# Patient Record
Sex: MALE | Race: WHITE | Employment: UNEMPLOYED | URBAN - METROPOLITAN AREA
[De-identification: names, ages, dates, MRNs, and addresses within clinical notes are randomized per-mention and may not be internally consistent; named-entity substitution may affect disease eponyms.]

---

## 2017-02-22 ENCOUNTER — OFFICE VISIT (OUTPATIENT)
Dept: FAMILY MEDICINE CLINIC | Facility: CLINIC | Age: 52
End: 2017-02-22

## 2017-02-22 VITALS — TEMPERATURE: 98 F | WEIGHT: 315 LBS

## 2017-02-22 DIAGNOSIS — R31.9 HEMATURIA: Primary | ICD-10-CM

## 2017-02-22 PROCEDURE — 99212 OFFICE O/P EST SF 10 MIN: CPT | Performed by: FAMILY MEDICINE

## 2017-02-22 PROCEDURE — 99202 OFFICE O/P NEW SF 15 MIN: CPT | Performed by: FAMILY MEDICINE

## 2017-02-22 RX ORDER — CLOPIDOGREL BISULFATE 75 MG/1
TABLET ORAL
Refills: 12 | COMMUNITY
Start: 2017-01-04

## 2017-02-22 RX ORDER — ASPIRIN 81 MG/1
81 TABLET ORAL
COMMUNITY

## 2017-02-22 RX ORDER — ATORVASTATIN CALCIUM 40 MG/1
TABLET, FILM COATED ORAL
Refills: 6 | COMMUNITY
Start: 2017-01-04

## 2017-02-22 RX ORDER — GLYBURIDE 5 MG/1
10 TABLET ORAL
COMMUNITY

## 2017-02-22 RX ORDER — ATENOLOL 50 MG/1
TABLET ORAL
Refills: 6 | COMMUNITY
Start: 2017-01-04

## 2017-02-22 RX ORDER — LISINOPRIL 10 MG/1
TABLET ORAL
Refills: 6 | COMMUNITY
Start: 2017-01-04

## 2017-02-22 NOTE — PROGRESS NOTES
HPI:    Patient ID: Dennis Jorge is a 46year old male. HPI Comments: Pt presents for follow up from the urgent care/ ER for blood in urine. Pt had recent [place] [facility] [place] and was started on plavix and then noticed blood in urine.  Went to th encounter.        Meds This Visit:    No prescriptions requested or ordered in this encounter       Imaging & Referrals:  UROLOGY - INTERNAL       #[phone]

## 2017-02-27 ENCOUNTER — OFFICE VISIT (OUTPATIENT)
Dept: SURGERY | Facility: CLINIC | Age: 52
End: 2017-02-27

## 2017-02-27 VITALS
TEMPERATURE: 98 F | RESPIRATION RATE: 18 BRPM | DIASTOLIC BLOOD PRESSURE: 81 MMHG | BODY MASS INDEX: 42.66 KG/M2 | SYSTOLIC BLOOD PRESSURE: 120 MMHG | HEIGHT: 72 IN | WEIGHT: 315 LBS | HEART RATE: 70 BPM

## 2017-02-27 DIAGNOSIS — R31.0 GROSS HEMATURIA: Primary | ICD-10-CM

## 2017-02-27 DIAGNOSIS — R82.90 URINE FINDING: ICD-10-CM

## 2017-02-27 LAB
APPEARANCE: CLEAR
MULTISTIX LOT#: NORMAL NUMERIC
PH, URINE: 5.5 (ref 4.5–8)
SPECIFIC GRAVITY: 1.02 (ref 1–1.03)
URINE-COLOR: YELLOW
UROBILINOGEN,SEMI-QN: 0.2 MG/DL (ref 0–1.9)

## 2017-02-27 PROCEDURE — 99212 OFFICE O/P EST SF 10 MIN: CPT | Performed by: UROLOGY

## 2017-02-27 PROCEDURE — 99244 OFF/OP CNSLTJ NEW/EST MOD 40: CPT | Performed by: UROLOGY

## 2017-02-27 PROCEDURE — 81003 URINALYSIS AUTO W/O SCOPE: CPT | Performed by: UROLOGY

## 2017-02-27 NOTE — PROGRESS NOTES
SUBJECTIVE:  Dennis Jorge is a 46year old male who presents for a consultation at the request of, and a copy of this note will be sent to, Dr. Kenn Hurst, for evaluation of  hematuria. He states that the problem is much improved.  Symptoms include had stools. GENERAL: Denies:  weight gain, weight loss, fever, night sweats, bone pain, malaise and fatigue. Positive for:  None.   All other ROS reviewed and otherwise normal.    OBJECTIVE:  /81 mmHg  Pulse 70  Temp(Src) 98.4 °F (36.9 °C) (Oral)  Resp 1

## 2017-02-28 ENCOUNTER — TELEPHONE (OUTPATIENT)
Dept: SURGERY | Facility: CLINIC | Age: 52
End: 2017-02-28

## 2017-02-28 NOTE — TELEPHONE ENCOUNTER
Pt was in to see Dr. Mauro Mohamud 2/27/17 and pt signed KIM so that we can receive medial records from both, Ashanti Mason [phone] f. [phone]. KIM was faxed and we received fax confirmation.  Then I faxed KIM to Dr. Sandy Potter [phone]

## 2017-03-02 NOTE — TELEPHONE ENCOUNTER
We received medical records from [facility] on 3/2/17 put in folder for upcoming appt on 3/23/17

## 2017-03-03 ENCOUNTER — TELEPHONE (OUTPATIENT)
Dept: SURGERY | Facility: CLINIC | Age: 52
End: 2017-03-03

## 2017-03-03 NOTE — TELEPHONE ENCOUNTER
Hussein from insurance verification called. pt is scheduled for outpt CT scan.   pt has public aid, HMO, he needs to have this done at a different hospital.

## 2017-03-13 ENCOUNTER — TELEPHONE (OUTPATIENT)
Dept: SURGERY | Facility: CLINIC | Age: 52
End: 2017-03-13

## 2017-03-13 NOTE — TELEPHONE ENCOUNTER
Phone call to [facility] to s/w Eliz Gatica but got a generic voicemail, I left a message with my direct line to have them call me back.  Does not accept [facility].

## 2017-03-13 NOTE — TELEPHONE ENCOUNTER
See also judie From pt dated today, 3/13. Phoned pt and spoke with him. He states he spoke with his insurance. He did not realized [facility] is out of network for him.  States he cancelled his future cysto appt, as well, as he will need to find care within [place]

## (undated) NOTE — MR AVS SNAPSHOT
[facility]  [address]  [phone]               Thank you for choosing us for your health care visit with Chris Arceo MD.  We are glad to serve you and happy to provide you with this summary insurance company's guidelines for prior authorization for this test.  You may be held responsible for payment in full if proper authorization is not acquired. Please contact the Patient Business Office at [phone] if you have any questions related to i LEUKOCYTES neg Negative    APPEARANCE clear Clear    URINE-COLOR yellow Yellow    Multistix Lot# [id number] Numeric    Multistix Expiration Date 2017/9 Date    02/22/17                  MyChart                  Visit [facility] online at  [email or web address]

## (undated) NOTE — MR AVS SNAPSHOT
[facility] [place] [address] [place]  [id number] [phone]               Thank you for choosing us for your health care visit with Asaf Martin MD.  We are glad to serve you and happy to provide you with this summary of y or be assured that none are required. You can then schedule your appointment. Failure to obtain required authorization numbers can create reimbursement difficulties for you. Assoc Dx:   Hematuria [R31.9]          Reason for Today's Visit     ER F/U Eat plenty of low-fat dairy products High fat meats and dairy   Choose whole grain products Foods high in sodium   Water is best for hydration Fast food.    Eat at home when possible     Tips for increasing your physical activity – Adults who are physically